# Patient Record
Sex: FEMALE | Race: WHITE | NOT HISPANIC OR LATINO | Employment: OTHER | URBAN - METROPOLITAN AREA
[De-identification: names, ages, dates, MRNs, and addresses within clinical notes are randomized per-mention and may not be internally consistent; named-entity substitution may affect disease eponyms.]

---

## 2017-05-08 ENCOUNTER — OFFICE VISIT (OUTPATIENT)
Dept: OPHTHALMOLOGY | Facility: CLINIC | Age: 67
End: 2017-05-08
Payer: MEDICARE

## 2017-05-08 DIAGNOSIS — H52.4 MYOPIA WITH PRESBYOPIA, BILATERAL: ICD-10-CM

## 2017-05-08 DIAGNOSIS — H52.13 MYOPIA WITH PRESBYOPIA, BILATERAL: ICD-10-CM

## 2017-05-08 DIAGNOSIS — H18.463 PELLUCID MARGINAL DEGENERATION OF BOTH CORNEAS: Primary | ICD-10-CM

## 2017-05-08 PROCEDURE — 92025 CPTRIZED CORNEAL TOPOGRAPHY: CPT | Mod: S$GLB,,, | Performed by: OPTOMETRIST

## 2017-05-08 PROCEDURE — 99999 PR PBB SHADOW E&M-EST. PATIENT-LVL II: CPT | Mod: PBBFAC,,, | Performed by: OPTOMETRIST

## 2017-05-08 PROCEDURE — 92015 DETERMINE REFRACTIVE STATE: CPT | Mod: S$GLB,,, | Performed by: OPTOMETRIST

## 2017-05-08 PROCEDURE — 92014 COMPRE OPH EXAM EST PT 1/>: CPT | Mod: S$GLB,,, | Performed by: OPTOMETRIST

## 2017-05-08 RX ORDER — ERGOCALCIFEROL 1.25 MG/1
50000 CAPSULE ORAL
COMMUNITY
End: 2018-02-21

## 2017-05-08 RX ORDER — CALCIUM CARBONATE 600 MG
600 TABLET ORAL
COMMUNITY
End: 2018-03-07

## 2017-05-08 NOTE — PROGRESS NOTES
HPI     Eye Exam    Additional comments: yearly-RGP           Fx Glaucoma    Additional comments: father           Comments   Pt's last eye exam was 5/5/16 with slc. Wears RGP lenses. Difficulty   seeing distance and near, states vision is not clear. Last year patient   had similar complaints and we adjusted the fit of the lens, but pt says   that it has not improved since last fitting. Interested in trying   multifocal contacts if possible. Patient's home ph #[phone]       Last edited by Karoline Prater, OD on 5/8/2017  2:11 PM. (History)            Assessment /Plan     For exam results, see Encounter Report.    Pellucid marginal degeneration of both corneas  -     Computerized corneal topography    Myopia with presbyopia, bilateral    Normal fundus exam.  Long-term rigid gas permeable contact lens wearer.  Monovision is no longer comfortable so we'll try multifocal lenses.  Ordered Premier Ultima multifocals with Steve Hernandez.  Check at dispense.

## 2017-05-08 NOTE — MR AVS SNAPSHOT
[facility] - Ophthalmology  [address] [facility] [place] [address]  Phone: [phone]  Fax: [phone]                  Laura Martini   2017 1:00 PM   Office Visit    Description:  Female : 1950   Provider:  Karoline Prater, OD   Department:  [facility] - Ophthalmology           Reason for Visit     Eye Exam     Blurred Vision     Fx Glaucoma           Diagnoses this Visit        Comments    Pellucid marginal degeneration of both corneas    -  Primary     Myopia with presbyopia, bilateral                To Do List           Future Appointments        Provider Department Dept Phone    2017 7:50 AM LABORATORY, [facility] [phone]      Goals (5 Years of Data)     None      [facility]     [facility] -  Assistance  Unless otherwise directed by your provider, please contact [facility], our nurse care line that is available for  assistance.     Registered nurses in the [facility] provide: appointment scheduling, clinical advisement, health education, and other advisory services.  Call: [phone] (toll free)               Medications           Message regarding Medications     Verify the changes and/or additions to your medication regime listed below are the same as discussed with your clinician today.  If any of these changes or additions are incorrect, please notify your healthcare provider.             Verify that the below list of medications is an accurate representation of the medications you are currently taking.  If none reported, the list may be blank. If incorrect, please contact your healthcare provider. Carry this list with you in case of emergency.           Current Medications     b complex vitamins capsule Take 1 capsule by mouth once daily.    Ca-D3-mag ox-zinc--marie-bor 600 mg calcium- 800 unit-50 mg Tab Take 1 tablet by mouth once daily.    calcium carbonate (OS-RADHA) 600 mg (1,500 mg)  Tab Take 600 mg by mouth.    CHOLECALCIFEROL, VITAMIN D3, (VITAMIN D3 ORAL) Take 10,000 Units by mouth once daily.    ergocalciferol (ERGOCALCIFEROL) 50,000 unit Cap Take 50,000 Units by mouth.    estradiol (ESTRACE) 1 MG tablet Take 1 mg by mouth once daily.    fish oil-omega-3 fatty acids 300-1,000 mg capsule Take 2 g by mouth once daily.    losartan (COZAAR) 50 MG tablet Take 1 tablet (50 mg total) by mouth once daily.    medroxyPROGESTERone (PROVERA) 10 MG tablet Take 10 mg by mouth once daily.           Clinical Reference Information           Allergies as of 5/8/2017     No Known Allergies      Immunizations Administered on Date of Encounter - 5/8/2017     None      Orders Placed During Today's Visit      Normal Orders This Visit    Computerized corneal topography       Language Assistance Services     ATTENTION: Language assistance services are available, free of charge. Please call [phone].      ATENCIÓN: Si samuella rodolfo, tiene a sun disposición servicios gratuitos de asistencia lingüística. Llame al [phone].     [facility] Ý: N?u b?n nói Ti?ng Vi?t, có các d?ch v? h? tr? ngôn ng? mi?n phí dành cho b?n. G?i s? [phone].         [facility] complies with applicable Federal civil rights laws and does not discriminate on the basis of race, color, national origin, age, disability, or sex.

## 2017-06-05 ENCOUNTER — OFFICE VISIT (OUTPATIENT)
Dept: OPHTHALMOLOGY | Facility: CLINIC | Age: 67
End: 2017-06-05
Payer: MEDICARE

## 2017-06-05 DIAGNOSIS — H52.13 MYOPIA WITH PRESBYOPIA, BILATERAL: Primary | ICD-10-CM

## 2017-06-05 DIAGNOSIS — H52.4 MYOPIA WITH PRESBYOPIA, BILATERAL: Primary | ICD-10-CM

## 2017-06-05 DIAGNOSIS — Z46.0 CONTACT LENS/GLASSES FITTING: ICD-10-CM

## 2017-06-05 PROCEDURE — 99499 UNLISTED E&M SERVICE: CPT | Mod: S$GLB,,, | Performed by: OPTOMETRIST

## 2017-06-05 NOTE — PROGRESS NOTES
HPI     Contact Lens Follow Up    Additional comments: RGP lens-multi focal            Comments   Pt was last seen 5/8/17 by [facility]. Here today for multifocal RGP follow-up.   Pt picked up lenses from Optical before exam and has them in right now.   States that when she first put lenses in her distance vision was very   clear, but now is slightly blurry. Comfort is great.        Last edited by Sobeida Dewitt on 6/5/2017 11:13 AM. (History)            Assessment /Plan     For exam results, see Encounter Report.    Myopia with presbyopia, bilateral    Contact lens/glasses fitting      First RGP multifocal contacts dispensed today.  OR at distance: -0.25/[place]    OR at near : [place]'  Contacts fit well but aren't wetting well yet.  Patient to try wearing both multifocals as well as modified monovision with 1 multifocal on right eye and a distance lens on left eye (left eye dominant).  Return to clinic 1-2 weeks.

## 2017-06-13 ENCOUNTER — OFFICE VISIT (OUTPATIENT)
Dept: OPHTHALMOLOGY | Facility: CLINIC | Age: 67
End: 2017-06-13
Payer: MEDICARE

## 2017-06-13 DIAGNOSIS — Z46.0 CONTACT LENS/GLASSES FITTING: Primary | ICD-10-CM

## 2017-06-13 PROCEDURE — 99499 UNLISTED E&M SERVICE: CPT | Mod: S$GLB,,, | Performed by: OPTOMETRIST

## 2017-12-11 DIAGNOSIS — I10 HTN (HYPERTENSION), BENIGN: ICD-10-CM

## 2017-12-11 RX ORDER — LOSARTAN POTASSIUM 50 MG/1
50 TABLET ORAL DAILY
Qty: 90 TABLET | Refills: 0 | Status: SHIPPED | OUTPATIENT
Start: 2017-12-11 | End: 2018-02-21 | Stop reason: SDUPTHER

## 2018-02-09 ENCOUNTER — PATIENT OUTREACH (OUTPATIENT)
Dept: ADMINISTRATIVE | Facility: HOSPITAL | Age: 68
End: 2018-02-09

## 2018-02-09 DIAGNOSIS — Z00.00 ANNUAL PHYSICAL EXAM: Primary | ICD-10-CM

## 2018-02-16 ENCOUNTER — LAB VISIT (OUTPATIENT)
Dept: LAB | Facility: HOSPITAL | Age: 68
End: 2018-02-16
Attending: FAMILY MEDICINE
Payer: MEDICARE

## 2018-02-16 DIAGNOSIS — Z00.00 ANNUAL PHYSICAL EXAM: ICD-10-CM

## 2018-02-16 LAB
25(OH)D3+25(OH)D2 SERPL-MCNC: 50 NG/ML
ALBUMIN SERPL BCP-MCNC: 3.9 G/DL
ALP SERPL-CCNC: 55 U/L
ALT SERPL W/O P-5'-P-CCNC: 12 U/L
ANION GAP SERPL CALC-SCNC: 10 MMOL/L
AST SERPL-CCNC: 23 U/L
BASOPHILS # BLD AUTO: 0.06 K/UL
BASOPHILS NFR BLD: 1.2 %
BILIRUB SERPL-MCNC: 0.9 MG/DL
BUN SERPL-MCNC: 20 MG/DL
CALCIUM SERPL-MCNC: 9.7 MG/DL
CHLORIDE SERPL-SCNC: 106 MMOL/L
CHOLEST SERPL-MCNC: 247 MG/DL
CHOLEST/HDLC SERPL: 2.7 {RATIO}
CO2 SERPL-SCNC: 26 MMOL/L
CREAT SERPL-MCNC: 0.9 MG/DL
DIFFERENTIAL METHOD: ABNORMAL
EOSINOPHIL # BLD AUTO: 0.2 K/UL
EOSINOPHIL NFR BLD: 4.3 %
ERYTHROCYTE [DISTWIDTH] IN BLOOD BY AUTOMATED COUNT: 12 %
EST. GFR  (AFRICAN AMERICAN): >60 ML/MIN/1.73 M^2
EST. GFR  (NON AFRICAN AMERICAN): >60 ML/MIN/1.73 M^2
GLUCOSE SERPL-MCNC: 82 MG/DL
HCT VFR BLD AUTO: 40 %
HDLC SERPL-MCNC: 90 MG/DL
HDLC SERPL: 36.4 %
HGB BLD-MCNC: 13.1 G/DL
IMM GRANULOCYTES # BLD AUTO: 0.02 K/UL
IMM GRANULOCYTES NFR BLD AUTO: 0.4 %
LDLC SERPL CALC-MCNC: 143.6 MG/DL
LYMPHOCYTES # BLD AUTO: 1.8 K/UL
LYMPHOCYTES NFR BLD: 37.6 %
MCH RBC QN AUTO: 33.2 PG
MCHC RBC AUTO-ENTMCNC: 32.8 G/DL
MCV RBC AUTO: 101 FL
MONOCYTES # BLD AUTO: 0.5 K/UL
MONOCYTES NFR BLD: 10.8 %
NEUTROPHILS # BLD AUTO: 2.2 K/UL
NEUTROPHILS NFR BLD: 45.7 %
NONHDLC SERPL-MCNC: 157 MG/DL
NRBC BLD-RTO: 0 /100 WBC
PLATELET # BLD AUTO: 188 K/UL
PMV BLD AUTO: 11.6 FL
POTASSIUM SERPL-SCNC: 4.4 MMOL/L
PROT SERPL-MCNC: 6.9 G/DL
RBC # BLD AUTO: 3.95 M/UL
SODIUM SERPL-SCNC: 142 MMOL/L
TRIGL SERPL-MCNC: 67 MG/DL
TSH SERPL DL<=0.005 MIU/L-ACNC: 2.67 UIU/ML
WBC # BLD AUTO: 4.9 K/UL

## 2018-02-16 PROCEDURE — 80053 COMPREHEN METABOLIC PANEL: CPT

## 2018-02-16 PROCEDURE — 36415 COLL VENOUS BLD VENIPUNCTURE: CPT | Mod: PO

## 2018-02-16 PROCEDURE — 85025 COMPLETE CBC W/AUTO DIFF WBC: CPT

## 2018-02-16 PROCEDURE — 80061 LIPID PANEL: CPT

## 2018-02-16 PROCEDURE — 82306 VITAMIN D 25 HYDROXY: CPT

## 2018-02-16 PROCEDURE — 84443 ASSAY THYROID STIM HORMONE: CPT

## 2018-02-21 ENCOUNTER — OFFICE VISIT (OUTPATIENT)
Dept: INTERNAL MEDICINE | Facility: CLINIC | Age: 68
End: 2018-02-21
Payer: MEDICARE

## 2018-02-21 VITALS
TEMPERATURE: 99 F | SYSTOLIC BLOOD PRESSURE: 120 MMHG | BODY MASS INDEX: 22.99 KG/M2 | HEIGHT: 65 IN | DIASTOLIC BLOOD PRESSURE: 80 MMHG | WEIGHT: 138 LBS | HEART RATE: 80 BPM

## 2018-02-21 DIAGNOSIS — Z23 NEED FOR PNEUMOCOCCAL VACCINATION: ICD-10-CM

## 2018-02-21 DIAGNOSIS — E53.8 B12 DEFICIENCY: ICD-10-CM

## 2018-02-21 DIAGNOSIS — Z79.890 POSTMENOPAUSAL HRT (HORMONE REPLACEMENT THERAPY): ICD-10-CM

## 2018-02-21 DIAGNOSIS — I10 HTN (HYPERTENSION), BENIGN: ICD-10-CM

## 2018-02-21 DIAGNOSIS — Z00.00 MEDICARE ANNUAL WELLNESS VISIT, SUBSEQUENT: Primary | ICD-10-CM

## 2018-02-21 DIAGNOSIS — E78.89 ELEVATED HDL: ICD-10-CM

## 2018-02-21 DIAGNOSIS — G62.9 NEUROPATHY: ICD-10-CM

## 2018-02-21 DIAGNOSIS — R71.8 ELEVATED MCV: ICD-10-CM

## 2018-02-21 DIAGNOSIS — Z12.11 COLON CANCER SCREENING: ICD-10-CM

## 2018-02-21 PROCEDURE — 99214 OFFICE O/P EST MOD 30 MIN: CPT | Mod: 25,S$GLB,, | Performed by: FAMILY MEDICINE

## 2018-02-21 PROCEDURE — G0439 PPPS, SUBSEQ VISIT: HCPCS | Mod: S$GLB,,, | Performed by: FAMILY MEDICINE

## 2018-02-21 PROCEDURE — 90670 PCV13 VACCINE IM: CPT | Mod: S$GLB,,, | Performed by: FAMILY MEDICINE

## 2018-02-21 PROCEDURE — G0009 ADMIN PNEUMOCOCCAL VACCINE: HCPCS | Mod: S$GLB,,, | Performed by: FAMILY MEDICINE

## 2018-02-21 PROCEDURE — 3008F BODY MASS INDEX DOCD: CPT | Mod: S$GLB,,, | Performed by: FAMILY MEDICINE

## 2018-02-21 PROCEDURE — 96372 THER/PROPH/DIAG INJ SC/IM: CPT | Mod: S$GLB,,, | Performed by: FAMILY MEDICINE

## 2018-02-21 PROCEDURE — 99999 PR PBB SHADOW E&M-EST. PATIENT-LVL III: CPT | Mod: PBBFAC,,, | Performed by: FAMILY MEDICINE

## 2018-02-21 RX ORDER — CYANOCOBALAMIN 1000 UG/ML
1000 INJECTION, SOLUTION INTRAMUSCULAR; SUBCUTANEOUS
Status: COMPLETED | OUTPATIENT
Start: 2018-02-21 | End: 2018-02-21

## 2018-02-21 RX ORDER — LOSARTAN POTASSIUM 50 MG/1
50 TABLET ORAL DAILY
Qty: 90 TABLET | Refills: 4 | Status: SHIPPED | OUTPATIENT
Start: 2018-02-21 | End: 2019-03-12 | Stop reason: SDUPTHER

## 2018-02-21 RX ADMIN — CYANOCOBALAMIN 1000 MCG: 1000 INJECTION, SOLUTION INTRAMUSCULAR; SUBCUTANEOUS at 11:02

## 2018-02-21 NOTE — PROGRESS NOTES
"Subjective:      Patient ID: Laura Martini is a 67 y.o. female.    Chief Complaint: numbness in feet    Disclaimer:  This note is prepared using voice recognition software and as such is likely to have errors and has not been proof read. Please contact me for questions.     Pt is coming in today for wellness exam however she's noticed that she's been having some numbness in her feet.  This been going off and on for quite some time.  At times it's more tingling other times it is actually now.  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  She also has some issues in the past with her back.  But mainly she just wanted to have it evaluated because if it was a progressive condition she wanted to get it treated.  She's had persistently elevated MCV levels.  She's been taking B complex vitamin sublingually.  She is not anemic area she reports she did have to receive B12 injections when she was pregnant.  She has not had any since that time.  She does not have diabetes.  She does not have any trouble walking.  It's mainly the balls of her feet.            Medication Refill   Associated symptoms include numbness.       Lab Results   Component Value Date    WBC 4.90 02/16/2018    HGB 13.1 02/16/2018    HCT 40.0 02/16/2018     02/16/2018    CHOL 247 (H) 02/16/2018    TRIG 67 02/16/2018    HDL 90 (H) 02/16/2018    ALT 12 02/16/2018    AST 23 02/16/2018     02/16/2018    K 4.4 02/16/2018     02/16/2018    CREATININE 0.9 02/16/2018    BUN 20 02/16/2018    CO2 26 02/16/2018    TSH 2.671 02/16/2018       Review of Systems   Endocrine: Negative for cold intolerance and heat intolerance.   Musculoskeletal: Negative for gait problem.   Skin: Negative for color change and wound.   Neurological: Positive for numbness.   Hematological: Does not bruise/bleed easily.     Objective:     Vitals:    02/21/18 1036   BP: 120/80   Pulse: 80   Temp: 98.5 °F (36.9 °C)   Weight: 62.6 kg (138 lb 0.1 oz)   Height: 5' 4.5" (1.638 m) "     Physical Exam   Cardiovascular:   Pulses:       Dorsalis pedis pulses are 2+ on the right side, and 2+ on the left side.   Musculoskeletal:        Right foot: There is normal range of motion, no tenderness, no bony tenderness, no swelling, normal capillary refill and no deformity.        Left foot: There is normal range of motion, no tenderness, no bony tenderness, no swelling and no deformity.   Feet:   Right Foot:   Protective Sensation: 4 sites tested. 2 sites sensed.   Skin Integrity: Positive for warmth, callus and dry skin. Negative for ulcer, blister, skin breakdown or erythema.   Left Foot:   Protective Sensation: 4 sites tested. 2 sites sensed.   Skin Integrity: Positive for warmth, callus and dry skin. Negative for ulcer, blister, skin breakdown or erythema.     Assessment:     1. Neuropathy    2. B12 deficiency    3. Elevated MCV                              Plan:   Laura was seen today for numbness in feet.     Diagnoses and all orders for this visit:    Neuropathy- new problem.  Ongoing now for over one year.  Willing to do workup.  Will schedule EMG.  Patient has been on a B vitamin sublingually.  Will go ahead and proceed forward with an injection today.  If noted to be consistent with peripheral neuropathy will place her on monthly B12 injections.  If negative then would presume workup for Ray nods versus other etiologies.  -     EMG W/ ULTRASOUND AND NERVE CONDUCTION TEST 2 Extremities; Future    B12 deficiency-suspected currently on B complex vitamin switch over to an injection.  Obtain EMG studies  -     EMG W/ ULTRASOUND AND NERVE CONDUCTION TEST 2 Extremities; Future    Elevated MCV-noted for several years now, no anemia, no change with the B complex vitamin switch to injections      -     cyanocobalamin injection 1,000 mcg; Inject 1 mL (1,000 mcg total) into the skin one time.            Follow-up in about 1 year (around 2/21/2019) for physical with Dr RODRIGUEZ.

## 2018-02-21 NOTE — PROGRESS NOTES
Subjective:      Patient ID: Laura Martini is a 67 y.o. female.    Chief Complaint: Annual Exam    Disclaimer:  This note is prepared using voice recognition software and as such is likely to have errors and has not been proof read. Please contact me for questions.     Pt is coming in today for prevention exam. She sees Dr. Gloria as her gynecologist on march 3rd. .   She is due for mammogram and she does every year because she is on hormone replacement therapy.  She does this with her gynecologist.  She is also on vitamin D supplementation and has had bone densities through her gynecologist.    She has a history of elevated HDL and total cholesterol but otherwise does well.  She is doing very well the blood pressure with a low dose of losartan.  She reports she's done a colonoscopy in 2007.  He was normal.  Willing to do a fit kit.  She's also needing a Prevnar injection today.    Laura Martini presented for a follow-up Medicare AWV today. The following components were reviewed and updated:    Medical history  Family History  Social history  Allergies and Current Medications  Health Maintenance  Patient Care Team:  Shy Gomez MD as PCP - General (Family Medicine)  Karoline Prater OD as Consulting Physician (Optometry)    **See Completed Assessments for Annual Wellness visit with in the encounter summary    Medicare wellness assessment:    Depression screening  1. In the past 2 weeks she has the patient felt down, depressed or hopeless? No  2. Over the past 2 weeks as the patient felt little interest or pleasure in doing things?  No   Functional Ability/ Safety Screening  1. Was the  Patient's timed up and go test unsteady or longer than 30 seconds?  No   2. Has the patient needed help with transportation shopping preparing meals housework laundry medications are managing money?  No  3.  If the patient's home have rugs in the hallway, back grab bars in the bathroom, or poor lighting? No, not necessary  "per patient.   4.has there been any hearing difficulties?  No  Advance Directives:  1. Patient does not have a living will in place.     ---------------------------------                  02/21/18                            1036            ---------------------------------   BP:             120/80            Pulse:            80              Temp:      98.5 °F (36.9 °C)      Weight: 62.6 kg (138 lb 0.1 oz)   Height:    5' 4.5" (1.638 m)     ---------------------------------  Body mass index is 23.32 kg/m².   )      Medicare Annual Wellness Visit, Subsequent   Patient Active Problem List:     Postmenopausal HRT (hormone replacement therapy)     Elevated HDL     HTN (hypertension), benign        Provided Laura with a 5-10 year written screening schedule and personal prevention plan. Recommendations were developed using the USPSTF age appropriate recommendations. Education, counseling, and referrals were provided as needed.  After Visit Summary printed and given to patient which includes a list of additional screenings\tests needed.    Mammogram  Obtain today from Dr Gloria at .   DEXA SCAN Obtain today from Dr Gloria at .  Pneumococcal (65+)(1 of 2 - PCV13) due on 04/29/2017- updating today.   Colonoscopy due on 07/23/2017  Lipid Panel due on 02/16/2019  TETANUS VACCINE due on 08/20/2024  Hepatitis C Screening Completed  Zoster Vaccine Completed  Influenza Vaccine Completed    Follow-up in about 1 year (around 2/21/2019) for physical with Dr RODRIGUEZ.      Shy Rodriguez MD          Lab Results   Component Value Date    WBC 4.90 02/16/2018    HGB 13.1 02/16/2018    HCT 40.0 02/16/2018     02/16/2018    CHOL 247 (H) 02/16/2018    TRIG 67 02/16/2018    HDL 90 (H) 02/16/2018    ALT 12 02/16/2018    AST 23 02/16/2018     02/16/2018    K 4.4 02/16/2018     02/16/2018    CREATININE 0.9 02/16/2018    BUN 20 02/16/2018    CO2 26 02/16/2018    TSH 2.671 02/16/2018       Review " "of Systems   Constitutional: Negative for activity change, appetite change, fatigue and fever.   HENT: Negative for congestion.    Respiratory: Negative for cough and shortness of breath.    Cardiovascular: Negative for chest pain and palpitations.   Gastrointestinal: Negative for abdominal distention and abdominal pain.     Objective:     Vitals:    02/21/18 1036   BP: 120/80   Pulse: 80   Temp: 98.5 °F (36.9 °C)   Weight: 62.6 kg (138 lb 0.1 oz)   Height: 5' 4.5" (1.638 m)     Physical Exam   Constitutional: She is oriented to person, place, and time. She appears well-developed and well-nourished.   HENT:   Head: Normocephalic and atraumatic.   Right Ear: External ear normal.   Left Ear: External ear normal.   Mouth/Throat: Oropharynx is clear and moist.   Eyes: EOM are normal.   Neck: Normal range of motion. Neck supple. No thyromegaly present.   Cardiovascular: Normal rate and regular rhythm.  Exam reveals no gallop and no friction rub.    No murmur heard.  Pulmonary/Chest: Effort normal. No respiratory distress. She has no wheezes. She has no rales.   Abdominal: Soft. Bowel sounds are normal. She exhibits no distension. There is no tenderness. There is no rebound.   Musculoskeletal: Normal range of motion. She exhibits no edema.   Lymphadenopathy:     She has no cervical adenopathy.   Neurological: She is alert and oriented to person, place, and time.   Psychiatric: She has a normal mood and affect. Her behavior is normal. Judgment and thought content normal.   Vitals reviewed.    Assessment:     1. Medicare annual wellness visit, subsequent                5. HTN (hypertension), benign    6. Elevated HDL    7. Postmenopausal HRT (hormone replacement therapy)    8. HTN (hypertension), benign    9. Colon cancer screening    10. Need for pneumococcal vaccination      Plan:   Laura was seen today for annual exam.    Diagnoses and all orders for this visit:    Medicare annual wellness visit, subsequent- see above.  " Update Prevnar.  Obtain mammogram bone density and Pap smear results from Dr. Gloria's office.  Reviewed labs today.        HTN (hypertension), benign- - stable, Continue with current medications and interventions. Labs reviewed.     -     losartan (COZAAR) 50 MG tablet; Take 1 tablet (50 mg total) by mouth once daily.    Elevated HDL - stable, Continue with current medications and interventions. Labs reviewed.       Postmenopausal HRT (hormone replacement therapy) - stable, Continue with current medications and interventions. Labs reviewed.     Colon cancer screening  -     Fecal Immunochemical Test (iFOBT); Future    Need for pneumococcal vaccination    Other orders  -     (In Office Administered) Pneumococcal Conjugate Vaccine (13 Valent) (IM)        Follow-up in about 1 year (around 2/21/2019) for physical with Dr RODRIGUEZ.

## 2018-03-07 ENCOUNTER — OFFICE VISIT (OUTPATIENT)
Dept: PHYSICAL MEDICINE AND REHAB | Facility: CLINIC | Age: 68
End: 2018-03-07
Payer: MEDICARE

## 2018-03-07 VITALS
HEIGHT: 65 IN | DIASTOLIC BLOOD PRESSURE: 66 MMHG | BODY MASS INDEX: 22.99 KG/M2 | WEIGHT: 138 LBS | HEART RATE: 60 BPM | RESPIRATION RATE: 14 BRPM | SYSTOLIC BLOOD PRESSURE: 132 MMHG

## 2018-03-07 DIAGNOSIS — M54.17 LUMBOSACRAL RADICULOPATHY AT S1: Primary | ICD-10-CM

## 2018-03-07 PROCEDURE — 3075F SYST BP GE 130 - 139MM HG: CPT | Mod: S$GLB,,, | Performed by: PHYSICAL MEDICINE & REHABILITATION

## 2018-03-07 PROCEDURE — 95886 MUSC TEST DONE W/N TEST COMP: CPT | Mod: S$GLB,,, | Performed by: PHYSICAL MEDICINE & REHABILITATION

## 2018-03-07 PROCEDURE — 3078F DIAST BP <80 MM HG: CPT | Mod: S$GLB,,, | Performed by: PHYSICAL MEDICINE & REHABILITATION

## 2018-03-07 PROCEDURE — 99204 OFFICE O/P NEW MOD 45 MIN: CPT | Mod: 25,S$GLB,, | Performed by: PHYSICAL MEDICINE & REHABILITATION

## 2018-03-07 PROCEDURE — 95909 NRV CNDJ TST 5-6 STUDIES: CPT | Mod: S$GLB,,, | Performed by: PHYSICAL MEDICINE & REHABILITATION

## 2018-03-07 PROCEDURE — 99999 PR PBB SHADOW E&M-EST. PATIENT-LVL III: CPT | Mod: PBBFAC,,, | Performed by: PHYSICAL MEDICINE & REHABILITATION

## 2018-03-07 NOTE — PATIENT INSTRUCTIONS
Exercises to Strengthen Your Lower Back  Strong lower back and abdominal muscles work together to support your spine. The exercises below will help strengthen the lower back. It is important that you begin exercising slowly and increase levels gradually.  Always begin any exercise program with stretching. If you feel pain while doing any of these exercises, stop and talk to your doctor about a more specific exercise program that better suits your condition.   Low back stretch  The point of stretching is to make you more flexible and increase your range of motion. Stretch only as much as you are able. Stretch slowly. Do not push your stretch to the limit. If at any point you feel pain while stretching, this is your (temporary) limit.  · Lie on your back with your knees bent and both feet on the ground.  · Slowly raise your left knee to your chest as you flatten your lower back against the floor. Hold for 5 seconds.  · Relax and repeat the exercise with your right knee.  · Do 10 of these exercises for each leg.  · Repeat hugging both knees to your chest at the same time.  Building lower back strength  Start your exercise routine with 10 to 30 minutes a day, 1 to 3 times a day.  Initial exercises  Lying on your back:  1. Ankle pumps: Move your foot up and down, towards your head, and then away. Repeat 10 times with each foot.  2. Heel slides: Slowly bend your knee, drawing the heel of your foot towards you. Then slide your heel/foot from you, straightening your knee. Do not lift your foot off the floor (this is not a leg lift).  3. Abdominal contraction: Bend your knees and put your hands on your stomach. Tighten your stomach muscles. Hold for 5 seconds, then relax. Repeat 10 times.  4. Straight leg raise: Bend one leg at the knee and keep the other leg straight. Tighten your stomach muscles. Slowly lift your straight leg 6 to 12 inches off the floor and hold for up to 5 seconds. Repeat 10 times on each  side.  Standin. Wall squats: Stand with your back against the wall. Move your feet about 12 inches away from the wall. Tighten your stomach muscles, and slowly bend your knees until they are at about a 45 degree angle. Do not go down too far. Hold about 5 seconds. Then slowly return to your starting position. Repeat 10 times.  2. Heel raises: Stand facing the wall. Slowly raise the heels of your feet up and down, while keeping your toes on the floor. If you have trouble balancing, you can touch the wall with your hands. Repeat 10 times.  More advanced exercises  When you feel comfortable enough, try these exercises.  1. Kneeling lumbar extension: Begin on your hands and knees. At the same time, raise and straighten your right arm and left leg until they are parallel to the ground. Hold for 2 seconds and come back slowly to a starting position. Repeat with left arm and right leg, alternating 10 times.  2. Prone lumbar extension: Lie face down, arms extended overhead, palms on the floor. At the same time, raise your right arm and left leg as high as comfortably possible. Hold for 10 seconds and slowly return to start. Repeat with left arm and right leg, alternating 10 times. Gradually build up to 20 times. (Advanced: Repeat this exercise raising both arms and both legs a few inches off the floor at the same time. Hold for 5 seconds and release.)  3. Pelvic tilt: Lie on the floor on your back with your knees bent at 90 degrees. Your feet should be flat on the floor. Inhale, exhale, then slowly contract your abdominal muscles bringing your navel toward your spine. Let your pelvis rock back until your lower back is flat on the floor. Hold for 10 seconds while breathing smoothly.  4. Abdominal crunch: Perform a pelvic tilt (above) flattening your lower back against the floor. Holding the tension in your abdominal muscles, take another breath and raise your shoulder blades off the ground (this is not a full sit-up).  Keep your head in line with your body (dont bend your neck forward). Hold for 2 seconds, then slowly lower.  Date Last Reviewed: 6/1/2016  © 2827-6025 [employer]. [address] [place] [address]. All rights reserved. This information is not intended as a substitute for professional medical care. Always follow your healthcare professional's instructions.        Possible Causes of Low Back or Leg Pain    The symptoms in your back or leg may be due to pressure on a nerve. This pressure may be caused by a damaged disk or by abnormal bone growth. Either way, you may feel pain, burning, tingling, or numbness. If you have pressure on a nerve that connects to the sciatic nerve, pain may shoot down your leg.    Pressure from the disk  Constant wear and tear can weaken a disk over time and cause back pain. The disk can then be damaged by a sudden movement or injury. If its soft center begins to bulge, the disk may press on a nerve. Or the outside of the disk may tear, and the soft center may squeeze through and pinch a nerve.    Pressure from bone  As a disk wears out, the vertebrae right above and below the disk begin to touch. This can put pressure on a nerve. Often, abnormal bone (called bone spurs) grows where the vertebrae rub against each other. This can cause the foramen or the spinal canal to narrow (called stenosis) and press against a nerve.  Date Last Reviewed: 10/4/2015  © 0525-1647 [employer]. [address] [place] [address]. All rights reserved. This information is not intended as a substitute for professional medical care. Always follow your healthcare professional's instructions.        Relieving Back Pain  Back pain is a common problem. You can strain back muscles by lifting too much weight or just by moving the wrong way. Back strain can be uncomfortable, even painful. And it can take weeks or months to improve. To help yourself feel better and prevent  future back strains, try these tips.  Important Note: Do not give aspirin to children or teens without first discussing it with your healthcare provider.      ? Ice    Ice reduces muscle pain and swelling. It helps most during the first 24 to 48 hours after an injury.  · Wrap an ice pack or a bag of frozen peas in a thin towel. (Never place ice directly on your skin.)  · Place the ice where your back hurts the most.  · Dont ice for more than 20 minutes at a time.  · You can use ice several times a day.  ? Medicines  Over-the-counter pain relievers can include acetaminophen and anti-inflammatory medicines, which includes aspirin or ibuprofen. They can help ease discomfort. Some also reduce swelling.  · Tell your healthcare provider about any medicines you are already taking.  · Take medicines only as directed.  ? Heat  After the first 48 hours, heat can relax sore muscles and improve blood flow.  · Try a warm bath or shower. Or use a heating pad set on low. To prevent a burn, keep a cloth between you and the heating pad.  · Dont use a heating pad for more than 15 minutes at a time. Never sleep on a heating pad.  Date Last Reviewed: 9/1/2015  © 8759-1451 [employer]. [address] [place] [address], [place] [address]. All rights reserved. This information is not intended as a substitute for professional medical care. Always follow your healthcare professional's instructions.        Understanding Lumbar Radiculopathy    Lumbar radiculopathy is irritation or inflammation of a nerve root in the low back. It causes symptoms that spread out from the back down one or both legs. To understand this condition, it helps to understand the parts of the spine:  · Vertebrae. These are bones that stack to form the spine. The lumbar spine contains the 5 bottom vertebrae.  · Disks. These are soft pads of tissue between the vertebrae. They act as shock absorbers for the spine.  · Spinal canal. This is a tunnel formed within the  stacked vertebrae. In the lumbar spine, nerves run through this canal.  · Nerves. These branch off and leave the spinal canal, traveling out to parts of the body. As they leave the spinal canal, nerves pass through openings between the vertebrae. The nerve root is the part of the nerve that is closest to the spinal canal.  · Sciatic nerve. This is a large nerve formed from several nerve roots in the low back. This nerve extends down the back of the leg to the foot.  With lumbar radiculopathy, nerve roots in the low back become irritated. This leads to pain and symptoms. The sciatic nerve is commonly involved, so the condition is often called sciatica.  What causes lumbar radiculopathy?  Aging, injury, poor posture, extra body weight, and other issues can lead to problems in the low back. These problems may then irritate nerve roots. They include:  · Damage to a disk in the lumbar spine. The damaged disk may then press on nearby nerve roots.  · Degeneration from wear and tear, and aging. This can lead to narrowing (stenosis) of the openings between the vertebrae. The narrowed openings press on nerve roots as they leave the spinal canal.  · Unstable spine. This is when a vertebra slips forward. It can then press on a nerve root.  Other, less common things can put pressure on nerves in the low back. These include diabetes, infection, or a tumor.  Symptoms of lumbar radiculopathy  These include:  · Pain in the low back  · Pain, numbness, tingling, or weakness that travels into the buttocks, hip, groin, or leg  · Muscle spasms  Treatment for lumbar radiculopathy  In most cases, your healthcare provider will first try treatments that help relieve symptoms. These may include:  · Prescription and over-the-counter pain medicines. These help relieve pain, swelling, and irritation.  · Limits on positions and activities that increase pain. But lying in bed or avoiding all movement is only recommended for a short period of  time.  · Physical therapy, including exercises and stretches. This helps decrease pain and increase movement and function.  · Steroid shots into the lower back. This may help relieve symptoms for a time.  · Weight-loss program. If you are overweight, losing extra pounds may help relieve symptoms.  In some cases, you may need surgery to fix the underlying problem. This depends on the cause, the symptoms, and how long the pain has lasted.  Possible complications  Over time, an irritated and inflamed nerve may become damaged. This may lead to long-lasting (permanent) numbness or weakness in your legs and feet. If symptoms change suddenly or get worse, be sure to let your healthcare provider know.  When to call your healthcare provider  Call your healthcare provider right away if you have any of these:  · New pain or pain that gets worse  · New or increasing weakness, tingling, or numbness in your leg or foot  · Problems controlling your bladder or bowel   Date Last Reviewed: 3/10/2016  © 1847-7162 [employer]. [address] [place] [address], [place] [address]. All rights reserved. This information is not intended as a substitute for professional medical care. Always follow your healthcare professional's instructions.

## 2018-03-07 NOTE — LETTER
March 7, 2018      Shy Gomez MD  [address] [facility]  [address]  [place] [address]           [facility] - Physiatry  [address] [facility]  [place] [address]  Phone: [phone]  Fax: [phone]          Patient: Laura Martini   MR Number: [id number]   YOB: 1950   Date of Visit: 3/7/2018       Dear Dr. Shy Gomez:    Thank you for referring Laura Martini to me for evaluation. Attached you will find relevant portions of my assessment and plan of care.    If you have questions, please do not hesitate to call me. I look forward to following Laura Martini along with you.    Sincerely,    Francesca Mendoza MD    Enclosure  CC:  No Recipients    If you would like to receive this communication electronically, please contact [email or web address] or [phone] to request more information on [employer] access.    For providers and/or their staff who would like to refer a patient to [facility], please contact us through our one-stop-shop provider referral line, [facility], at [phone].    If you feel you have received this communication in error or would no longer like to receive these types of communications, please e-mail [email or web address]

## 2018-03-07 NOTE — PROGRESS NOTES
[facility] [address] [place] [address]  Phone: [phone]          Full Name: Laura Martini YOB: 1950  Patient ID: [id number]      Visit Date: 3/7/2018 13:42  Age: 67 Years 10 Months Old  Examining Physician: Francesca Mendoza M.D.  Referring Physician: Dr. Gomez  Reason for Referral: Foot numbness        Chief Complaint   Patient presents with    Numbness     bilateral foot       HPI: This is a 67 y.o.  female being seen in clinic today for evaluation of chronic numbness/tingling in the bottom of her feet-worse on the left She denies pain and weakness, just a constant numbness/discomfort.  Nothing really exacerbates or alleviates her symptoms.  In the past she had a few episodes of low back pain that resolved and she has been treated for B12 deficiency.     History obtained from patient    Past family, medical, social, and surgical history reviewed in chart    Review of Systems:     General- denies lethargy, weight change, fever, chills  Head/neck- denies swallowing difficulties  ENT- denies hearing changes  Cardiovascular-denies chest pain  Pulmonary- denies shortness of breath  GI- denies constipation or bowel incontinence  - denies bladder incontinence  Skin- denies wounds or rashes  Musculoskeletal- denies weakness, denies pain  Neurologic- +numbness and tingling  Psychiatric- denies depressive or psychotic features, denies anxiety  Lymphatic-denies swelling  Endocrine- denies hypoglycemic symptoms/DM history  All other pertinent systems negative     Physical Examination:  General: Well developed, well nourished female, NAD  HEENT:NCAT EOMI bilaterally   Pulmonary:Normal respirations    Spinal Examination: CERVICAL  Active ROM is within normal limits.  Inspection: No deformity of spinal alignment.    Spinal Examination: LUMBAR or THORACIC  Active ROM is within normal limits.  Inspection: No deformity of spinal alignment.    Palpation: No vertebral tenderness to  percussion.  Mild ttp at si joints  slr neg bilaterally    Bilateral Upper and Lower Extremities:  Pulses are 2+ at radial bilaterally.  Shoulder/Elbow/Wrist/Hand ROM   Hip/Knee/Ankle ROM wnl  Bilateral Extremities show normal capillary refill.  No signs of cyanosis, rubor, edema, skin changes, or dysvascular changes of appendages.  Nails appear intact.    Neurological Exam:  Cranial Nerves:  II-XII grossly intact    Manual Muscle Testing: (Motor 5=normal)  5/5 strength bilateral lower extremities    No focal atrophy is noted of either lower extremity.    Bilateral Reflexes:1+patellar  No clonus at knee or ankle.    Sensation: tested to light touch  - intact in legs    Gait: Narrow base and good arm swing.      Entire procedure explained to patient prior to proceeding.  Verbal consent obtained        SNC      Nerve / Sites Rec. Site Onset Lat Peak Lat Amp Segments Distance Velocity     ms ms µV  mm m/s   L Sural - Ankle (Calf)      Calf Ankle 2.7 3.4 11.0 Calf - Ankle 140 52   R Sural - Ankle (Calf)      Calf Ankle 3.2 3.8 8.3 Calf - Ankle 140 43       MNC      Nerve / Sites Muscle Latency Amplitude Duration Rel Amp Segments Distance Lat Diff Velocity     ms mV ms %  mm ms m/s   L Peroneal - EDB      Ankle EDB 4.1 3.2 7.4 100 Ankle - EDB 80        Fib head EDB 10.7 3.0 8.0 93.5 Fib head - Ankle 300 6.7 45      Pop fossa EDB 12.8 2.9 7.4 96.7 Pop fossa - Fib head 90 2.1 43         Pop fossa - Ankle  8.8    R Peroneal - EDB      Ankle EDB 4.1 4.9 6.4 100 Ankle - EDB 80        Fib head EDB 9.9 4.9 7.1 98.9 Fib head - Ankle 290 5.8 50      Pop fossa EDB 11.7 4.8 6.7 99.2 Pop fossa - Fib head 90 1.8 51         Pop fossa - Ankle  7.6    L Tibial - AH      Ankle AH 3.8 6.2 7.4 100 Ankle - AH 80        Pop fossa AH 13.0 4.5 8.3 72.6 Pop fossa - Ankle 400 9.2 43   R Tibial - AH      Ankle AH 3.9 6.4 7.1 100 Ankle - AH 80        Pop fossa AH 13.7 5.9 7.9 91.8 Pop fossa - Ankle 390 9.8 40       EMG            EMG Summary Table      Spontaneous MUAP Recruitment   Muscle IA Fib PSW Fasc Other Amp Dur Polys Pattern Effort   L. Gastrocnemius (Medial head) Incr 1+ 2+ None . Sl Incr n 1+ sl red Max   L. Tibialis anterior N None None None . N n N N Max   L. Peroneus longus Incr 1+ None None . N n 1+ sl red Max   L. Biceps femoris (short head) Incr None None None . N n 1+ sl red Max   L. Rectus femoris N None None None . N n N N Max   L. Abductor hallucis Incr 1+ 2+ None . Sl Incr Sl Incr 1+ Reduced Max   R. Gastrocnemius (Medial head) Incr 1+ 1+ None . N n N sl red Max                              INTERPRETATION  -Bilateral sural sensory nerve  conduction study showed normal peak latency and amplitude  -Bilateral peroneal motor nerve conduction study showed normal latency, amplitude, and conduction velocity  -Bilateral tibial motor nerve conduction study showed normal latency, amplitude, and conduction velocity  -Needle EMG examination performed to above mentioned muscles     IMPRESSION  1. ABNORMAL study  2. There is electrodiagnostic evidence of an ACUTE on CHRONIC radiculopathy of the S1 nerve roots-slightly worse on the left    PLAN  1. Follow up with referring provider: Dr. Shy Gomez  2. Rec updated [facility] imaging. Rec referral to PT with focus on core and lower strength, light traction, ROM, gait/balance eval. (pt knows of PT near her home in [place]). Consider low dose gabapentin for symptom relief   3. This study is good for one year. If symptoms worsen or do not improve, please re-consult.    Francesca Mendoza M.D.  Physical Medicine and Rehab

## 2018-03-08 ENCOUNTER — APPOINTMENT (OUTPATIENT)
Dept: LAB | Facility: HOSPITAL | Age: 68
End: 2018-03-08
Attending: FAMILY MEDICINE
Payer: MEDICARE

## 2018-04-20 ENCOUNTER — PATIENT OUTREACH (OUTPATIENT)
Dept: ADMINISTRATIVE | Facility: HOSPITAL | Age: 68
End: 2018-04-20

## 2019-03-12 DIAGNOSIS — I10 HTN (HYPERTENSION), BENIGN: ICD-10-CM

## 2019-03-12 RX ORDER — LOSARTAN POTASSIUM 50 MG/1
TABLET ORAL
Qty: 90 TABLET | Refills: 0 | Status: SHIPPED | OUTPATIENT
Start: 2019-03-12 | End: 2019-06-18 | Stop reason: SDUPTHER

## 2019-06-18 DIAGNOSIS — I10 HTN (HYPERTENSION), BENIGN: ICD-10-CM

## 2019-06-18 RX ORDER — LOSARTAN POTASSIUM 50 MG/1
TABLET ORAL
Qty: 90 TABLET | Refills: 0 | Status: SHIPPED | OUTPATIENT
Start: 2019-06-18

## 2020-10-06 ENCOUNTER — PATIENT MESSAGE (OUTPATIENT)
Dept: ADMINISTRATIVE | Facility: HOSPITAL | Age: 70
End: 2020-10-06

## 2021-05-12 ENCOUNTER — PATIENT MESSAGE (OUTPATIENT)
Dept: RESEARCH | Facility: HOSPITAL | Age: 71
End: 2021-05-12

## 2024-05-16 NOTE — PROGRESS NOTES
HPI     Contact Lens Follow Up    Additional comments: rgp-old lens OS, new lens OD           Comments   Pt was last seen 6/5/17 by [facility]. Here today for clfu. Pt has old RGP   (distance) in OS, new multi focal RGP OD.     Options are 2 multifocal lenses or 1 multifocal lens and 1 distance lens.    She does well with either        Last edited by Karoline Prater, OD on 6/13/2017  2:13 PM. (History)            Assessment /Plan     For exam results, see Encounter Report.    Contact lens/glasses fitting      Laura has decided to keep both her RGP multifocal lenses as well as her single vision lenses.  She will probably use modified monovision most of the time with a multifocal lens on her right eye and a distance lens on her left.  The current left lens is too tight - I'll decrease the diameter as a means of reducing seal off.  Return to clinic 1 yr or as needed.                  Please schedule an appt with me.  I need to do an exam.  For diagnostic mammograms they require measurements, clinical information, etc on the order.    Schedule early next week.    Oseas Mendenhall MD  07:12 EDT  05/16/24